# Patient Record
Sex: MALE | Race: ASIAN | NOT HISPANIC OR LATINO | Employment: OTHER | URBAN - METROPOLITAN AREA
[De-identification: names, ages, dates, MRNs, and addresses within clinical notes are randomized per-mention and may not be internally consistent; named-entity substitution may affect disease eponyms.]

---

## 2023-05-02 ENCOUNTER — NEW PATIENT (OUTPATIENT)
Dept: URBAN - METROPOLITAN AREA CLINIC 26 | Facility: CLINIC | Age: 68
End: 2023-05-02

## 2023-05-02 VITALS
BODY MASS INDEX: 26.18 KG/M2 | WEIGHT: 187 LBS | SYSTOLIC BLOOD PRESSURE: 178 MMHG | HEART RATE: 71 BPM | HEIGHT: 71 IN | DIASTOLIC BLOOD PRESSURE: 89 MMHG

## 2023-05-02 DIAGNOSIS — E11.3552: ICD-10-CM

## 2023-05-02 DIAGNOSIS — E11.3591: ICD-10-CM

## 2023-05-02 DIAGNOSIS — H04.123: ICD-10-CM

## 2023-05-02 DIAGNOSIS — H33.41: ICD-10-CM

## 2023-05-02 PROCEDURE — 92250 FUNDUS PHOTOGRAPHY W/I&R: CPT

## 2023-05-02 PROCEDURE — 92235 FLUORESCEIN ANGRPH MLTIFRAME: CPT

## 2023-05-02 PROCEDURE — 92134 CPTRZ OPH DX IMG PST SGM RTA: CPT

## 2023-05-02 PROCEDURE — 99204 OFFICE O/P NEW MOD 45 MIN: CPT

## 2023-05-02 PROCEDURE — 67028 INJECTION EYE DRUG: CPT

## 2023-05-02 ASSESSMENT — TONOMETRY
OS_IOP_MMHG: 14
OD_IOP_MMHG: 12

## 2023-05-02 ASSESSMENT — VISUAL ACUITY
OS_CC: 20/25+2
OD_CC: 20/40+2
OD_PH: 20/25-2
OD_SC: 20/50-2
OS_SC: 20/50-2

## 2023-06-06 ENCOUNTER — CLINIC PROCEDURE ONLY (OUTPATIENT)
Dept: URBAN - METROPOLITAN AREA CLINIC 26 | Facility: CLINIC | Age: 68
End: 2023-06-06

## 2023-06-06 DIAGNOSIS — E11.3591: ICD-10-CM

## 2023-06-06 DIAGNOSIS — H33.41: ICD-10-CM

## 2023-06-06 DIAGNOSIS — E11.3552: ICD-10-CM

## 2023-06-06 PROCEDURE — 67028 INJECTION EYE DRUG: CPT

## 2023-06-06 PROCEDURE — 92134 CPTRZ OPH DX IMG PST SGM RTA: CPT

## 2023-06-06 PROCEDURE — 92250 FUNDUS PHOTOGRAPHY W/I&R: CPT

## 2023-06-06 ASSESSMENT — TONOMETRY: OD_IOP_MMHG: 14

## 2023-06-06 ASSESSMENT — VISUAL ACUITY: OD_SC: 20/30-2

## 2023-07-18 ENCOUNTER — CLINIC PROCEDURE ONLY (OUTPATIENT)
Dept: URBAN - METROPOLITAN AREA CLINIC 26 | Facility: CLINIC | Age: 68
End: 2023-07-18

## 2023-07-18 DIAGNOSIS — E11.3552: ICD-10-CM

## 2023-07-18 DIAGNOSIS — E11.3591: ICD-10-CM

## 2023-07-18 DIAGNOSIS — H33.41: ICD-10-CM

## 2023-07-18 PROCEDURE — 92250 FUNDUS PHOTOGRAPHY W/I&R: CPT

## 2023-07-18 PROCEDURE — 67028 INJECTION EYE DRUG: CPT

## 2023-07-18 PROCEDURE — 92134 CPTRZ OPH DX IMG PST SGM RTA: CPT

## 2023-07-18 ASSESSMENT — VISUAL ACUITY
OD_PH: 20/30-2
OD_SC: 20/40-1

## 2023-07-18 ASSESSMENT — TONOMETRY: OD_IOP_MMHG: 10

## 2023-08-30 ENCOUNTER — FOLLOW UP (OUTPATIENT)
Dept: URBAN - METROPOLITAN AREA CLINIC 26 | Facility: CLINIC | Age: 68
End: 2023-08-30

## 2023-08-30 DIAGNOSIS — H04.123: ICD-10-CM

## 2023-08-30 DIAGNOSIS — E11.3552: ICD-10-CM

## 2023-08-30 DIAGNOSIS — H33.41: ICD-10-CM

## 2023-08-30 DIAGNOSIS — E11.3591: ICD-10-CM

## 2023-08-30 PROCEDURE — 67028 INJECTION EYE DRUG: CPT

## 2023-08-30 PROCEDURE — 92250 FUNDUS PHOTOGRAPHY W/I&R: CPT

## 2023-08-30 PROCEDURE — 92134 CPTRZ OPH DX IMG PST SGM RTA: CPT

## 2023-08-30 PROCEDURE — 92014 COMPRE OPH EXAM EST PT 1/>: CPT

## 2023-08-30 ASSESSMENT — VISUAL ACUITY
OD_SC: 20/50-2
OD_PH: 20/40+1
OS_SC: 20/40
OS_PH: 20/30

## 2023-08-30 ASSESSMENT — TONOMETRY
OS_IOP_MMHG: 13
OD_IOP_MMHG: 12

## 2023-09-05 ENCOUNTER — EMERGENCY VISIT (OUTPATIENT)
Dept: URBAN - METROPOLITAN AREA CLINIC 26 | Facility: CLINIC | Age: 68
End: 2023-09-05

## 2023-09-05 DIAGNOSIS — E11.3552: ICD-10-CM

## 2023-09-05 DIAGNOSIS — H04.123: ICD-10-CM

## 2023-09-05 DIAGNOSIS — H33.41: ICD-10-CM

## 2023-09-05 DIAGNOSIS — E11.3591: ICD-10-CM

## 2023-09-05 DIAGNOSIS — H11.31: ICD-10-CM

## 2023-09-05 PROCEDURE — 99213 OFFICE O/P EST LOW 20 MIN: CPT

## 2023-09-05 ASSESSMENT — VISUAL ACUITY
OD_SC: 20/40-2
OS_SC: 20/30

## 2023-09-05 ASSESSMENT — TONOMETRY
OS_IOP_MMHG: 17
OD_IOP_MMHG: 14

## 2023-10-16 ENCOUNTER — ADDENDUM (OUTPATIENT)
Dept: URBAN - METROPOLITAN AREA CLINIC 26 | Facility: CLINIC | Age: 68
End: 2023-10-16

## 2023-10-31 ENCOUNTER — CLINIC PROCEDURE ONLY (OUTPATIENT)
Dept: URBAN - METROPOLITAN AREA CLINIC 26 | Facility: CLINIC | Age: 68
End: 2023-10-31

## 2023-10-31 DIAGNOSIS — H33.41: ICD-10-CM

## 2023-10-31 DIAGNOSIS — E11.3591: ICD-10-CM

## 2023-10-31 PROCEDURE — 92134 CPTRZ OPH DX IMG PST SGM RTA: CPT

## 2023-10-31 PROCEDURE — 67028 INJECTION EYE DRUG: CPT

## 2023-10-31 PROCEDURE — 92250 FUNDUS PHOTOGRAPHY W/I&R: CPT | Mod: 59

## 2023-10-31 ASSESSMENT — VISUAL ACUITY
OD_SC: 20/50
OD_PH: 20/40-2

## 2023-10-31 ASSESSMENT — TONOMETRY: OD_IOP_MMHG: 13

## 2023-11-29 ENCOUNTER — OFFICE VISIT (OUTPATIENT)
Dept: URBAN - METROPOLITAN AREA CLINIC 63 | Facility: CLINIC | Age: 68
End: 2023-11-29

## 2023-11-29 RX ORDER — CILOSTAZOL 100 MG/1
TABLET ORAL
Qty: 180 TABLET | Status: ACTIVE | COMMUNITY

## 2023-11-29 RX ORDER — ATORVASTATIN CALCIUM 80 MG/1
TABLET, FILM COATED ORAL
Qty: 90 TABLET | Status: ACTIVE | COMMUNITY

## 2023-11-29 RX ORDER — GABAPENTIN 100 MG/1
CAPSULE ORAL
Qty: 45 CAPSULE | Status: ACTIVE | COMMUNITY

## 2023-11-29 RX ORDER — HYDROCODONE BITARTRATE AND ACETAMINOPHEN 5; 325 MG/1; MG/1
TABLET ORAL
Qty: 12 TABLET | Status: ACTIVE | COMMUNITY

## 2023-11-29 RX ORDER — SULFAMETHOXAZOLE AND TRIMETHOPRIM 400; 80 MG/1; MG/1
TABLET ORAL
Qty: 20 TABLET | Status: ACTIVE | COMMUNITY

## 2023-11-29 RX ORDER — EZETIMIBE 10 MG/1
TABLET ORAL
Qty: 90 TABLET | Status: ACTIVE | COMMUNITY

## 2023-11-29 RX ORDER — DOXYCYCLINE HYCLATE 100 MG/1
TABLET ORAL
Qty: 28 TABLET | Status: ACTIVE | COMMUNITY

## 2023-11-29 RX ORDER — INSULIN LISPRO 100 [IU]/ML
INJECTION, SOLUTION INTRAVENOUS; SUBCUTANEOUS
Qty: 30 MILLILITER | Status: ACTIVE | COMMUNITY

## 2023-11-29 RX ORDER — CALCIUM ACETATE 667 MG/1
CAPSULE ORAL
Qty: 270 CAPSULE | Status: ACTIVE | COMMUNITY

## 2023-11-29 NOTE — HPI-TODAY'S VISIT:
Jordon is a pleasant 68-year-old male who presents today for evaluation of a surveillance colonoscopy.  History of end-stage renal disease on dialysis.  Previous EGD and colonoscopy in 2021 but no records available for reviewLabs dated 9/15/2023 showed a normal hemoglobin.  Normal platelets.  PT/INR normal.  Glucose 256, creatinine 5.9, GFR 9.6.  Normal LFTs.  CRP normal. Labs dated 9/26/2023 showed negative hep B surface antigen.  Normal hemoglobin.  Normal platelets.  Creatinine 7.56, GFR 7.  GGT normal.  Normal LFTs.  Hep A antibody total positive.  Hep B core antibody total negative.  Hep C negative.  PT/INR normal. CT abdomen/pelvis with contrast dated 10/16/2021 showed no evidence of metastatic disease.  Punctate bilateral renal nonobstructing calculi.  Severe calcifications of the left common femoral artery almost occlusive unchanged from April 2021

## 2023-12-06 ENCOUNTER — OFFICE VISIT (OUTPATIENT)
Dept: URBAN - METROPOLITAN AREA CLINIC 60 | Facility: CLINIC | Age: 68
End: 2023-12-06

## 2023-12-06 ENCOUNTER — OFFICE VISIT (OUTPATIENT)
Dept: URBAN - METROPOLITAN AREA CLINIC 60 | Facility: CLINIC | Age: 68
End: 2023-12-06
Payer: MEDICARE

## 2023-12-06 ENCOUNTER — LAB OUTSIDE AN ENCOUNTER (OUTPATIENT)
Dept: URBAN - METROPOLITAN AREA CLINIC 60 | Facility: CLINIC | Age: 68
End: 2023-12-06

## 2023-12-06 ENCOUNTER — DASHBOARD ENCOUNTERS (OUTPATIENT)
Age: 68
End: 2023-12-06

## 2023-12-06 VITALS
DIASTOLIC BLOOD PRESSURE: 66 MMHG | OXYGEN SATURATION: 98 % | HEART RATE: 83 BPM | TEMPERATURE: 97.6 F | WEIGHT: 183.2 LBS | HEIGHT: 61 IN | BODY MASS INDEX: 34.59 KG/M2 | SYSTOLIC BLOOD PRESSURE: 116 MMHG

## 2023-12-06 DIAGNOSIS — I25.10 CAD, MULTIPLE VESSEL: ICD-10-CM

## 2023-12-06 DIAGNOSIS — N18.6 ESRD (END STAGE RENAL DISEASE): ICD-10-CM

## 2023-12-06 DIAGNOSIS — Z86.010 PERSONAL HISTORY OF COLONIC POLYPS: ICD-10-CM

## 2023-12-06 DIAGNOSIS — Z99.2 DEPENDENCE ON RENAL DIALYSIS: ICD-10-CM

## 2023-12-06 PROBLEM — 400047006: Status: ACTIVE | Noted: 2023-12-06

## 2023-12-06 PROBLEM — 105502003: Status: ACTIVE | Noted: 2023-12-06

## 2023-12-06 PROBLEM — 46177005: Status: ACTIVE | Noted: 2023-12-06

## 2023-12-06 PROBLEM — 428982002: Status: ACTIVE | Noted: 2023-12-06

## 2023-12-06 PROBLEM — 371803003: Status: ACTIVE | Noted: 2023-12-06

## 2023-12-06 PROBLEM — 428283002: Status: ACTIVE | Noted: 2023-12-06

## 2023-12-06 PROCEDURE — 99203 OFFICE O/P NEW LOW 30 MIN: CPT | Performed by: PHYSICIAN ASSISTANT

## 2023-12-06 RX ORDER — CALCIUM ACETATE 667 MG/1
CAPSULE ORAL
Qty: 270 CAPSULE | Status: ACTIVE | COMMUNITY

## 2023-12-06 RX ORDER — HYDROCODONE BITARTRATE AND ACETAMINOPHEN 5; 325 MG/1; MG/1
TABLET ORAL
Qty: 12 TABLET | Status: ACTIVE | COMMUNITY

## 2023-12-06 RX ORDER — POLYETHYLENE GLYCOL-3350 AND ELECTROLYTES 236; 6.74; 5.86; 2.97; 22.74 G/274.31G; G/274.31G; G/274.31G; G/274.31G; G/274.31G
4000ML POWDER, FOR SOLUTION ORAL ONCE
Qty: 4000 MILLILITER | Refills: 0 | OUTPATIENT
Start: 2023-12-06 | End: 2023-12-07

## 2023-12-06 RX ORDER — SULFAMETHOXAZOLE AND TRIMETHOPRIM 400; 80 MG/1; MG/1
TABLET ORAL
Qty: 20 TABLET | Status: ACTIVE | COMMUNITY

## 2023-12-06 RX ORDER — ATORVASTATIN CALCIUM 80 MG/1
TABLET, FILM COATED ORAL
Qty: 90 TABLET | Status: ACTIVE | COMMUNITY

## 2023-12-06 RX ORDER — GABAPENTIN 100 MG/1
CAPSULE ORAL
Qty: 45 CAPSULE | Status: ACTIVE | COMMUNITY

## 2023-12-06 RX ORDER — PANTOPRAZOLE SODIUM 40 MG/1
TABLET, DELAYED RELEASE ORAL
Qty: 90 TABLET | Status: ACTIVE | COMMUNITY

## 2023-12-06 RX ORDER — INSULIN LISPRO 100 [IU]/ML
INJECTION, SOLUTION INTRAVENOUS; SUBCUTANEOUS
Qty: 30 MILLILITER | Status: ACTIVE | COMMUNITY

## 2023-12-06 RX ORDER — DOXYCYCLINE HYCLATE 100 MG/1
TABLET ORAL
Qty: 28 TABLET | Status: ACTIVE | COMMUNITY

## 2023-12-06 RX ORDER — CARVEDILOL 25 MG/1
TABLET, FILM COATED ORAL
Qty: 180 TABLET | Status: ACTIVE | COMMUNITY

## 2023-12-06 RX ORDER — CILOSTAZOL 100 MG/1
TABLET ORAL
Qty: 180 TABLET | Status: ACTIVE | COMMUNITY

## 2023-12-06 RX ORDER — EZETIMIBE 10 MG/1
TABLET ORAL
Qty: 90 TABLET | Status: ACTIVE | COMMUNITY

## 2023-12-06 RX ORDER — CLOPIDOGREL BISULFATE 75 MG/1
1 TABLET TABLET ORAL ONCE A DAY
Status: ACTIVE | COMMUNITY

## 2023-12-06 NOTE — HPI-TODAY'S VISIT:
68-year-old male with ESRD on hemodialysis is referred to the office to schedule colonoscopy in anticipation of kidney transplant. His past medical history is also significant for hypertension, CHF, prostate cancer, peripheral vascular disease, CAD with prior CABG in 2021, right carotid stenosis, diabetes mellitus, history of stroke His last EGD and colonoscopy were done September 21, 2021. Procedure notes are not available but his EGD apparently showed gastritis and esophagitis. Colonoscopy demonstrated multiple colon polyps.  He is following locally with Dr Lorenz and is scheduled for nuclear stress test in a couple weeks. He has no Gi complaints. He has no pyrosis, dysphagia, odynophagia, nausea, vomiting, abdominal pain, constipation, diarrhea, rectal bleeding.    Labs dated 9/15/2023 showed a normal hemoglobin.  Normal platelets.  PT/INR normal.  Glucose 256, creatinine 5.9, GFR 9.6.  Normal LFTs.  CRP normal. Labs dated 9/26/2023 showed negative hep B surface antigen.  Normal hemoglobin.  Normal platelets.  Creatinine 7.56, GFR 7.  GGT normal.  Normal LFTs.  Hep A antibody total positive.  Hep B core antibody total negative.  Hep C negative.  PT/INR normal. CT abdomen/pelvis with contrast dated 10/16/2021 showed no evidence of metastatic disease.  Punctate bilateral renal nonobstructing calculi.  Severe calcifications of the left common femoral artery almost occlusive unchanged from April 2021 PMH: End-stage renal disease on dialysis, anemia of chronic disease, renal osteodystrophy, hypertension, CHF, prostate cancer, PVD, hyperlipidemia PSH: Amputation of great toe, dialysis catheter, carotid stent, CABG, prostatectomy with pelvic lymphadenectomy, appendectomy, cardiac stent, eye surgery

## 2023-12-06 NOTE — HPI-TODAY'S VISIT:
Jordon is a pleasant 68-year-old male who presents today for evaluation of a surveillance colonoscopy.  History of end-stage renal disease on dialysis.  Previous EGD and colonoscopy in 2021 but no records available for reviewLabs dated 9/15/2023 showed a normal hemoglobin.  Normal platelets.  PT/INR normal.  Glucose 256, creatinine 5.9, GFR 9.6.  Normal LFTs.  CRP normal. Labs dated 9/26/2023 showed negative hep B surface antigen.  Normal hemoglobin.  Normal platelets.  Creatinine 7.56, GFR 7.  GGT normal.  Normal LFTs.  Hep A antibody total positive.  Hep B core antibody total negative.  Hep C negative.  PT/INR normal. CT abdomen/pelvis with contrast dated 10/16/2021 showed no evidence of metastatic disease.  Punctate bilateral renal nonobstructing calculi.  Severe calcifications of the left common femoral artery almost occlusive unchanged from April 2021 PMH: End-stage renal disease on dialysis, anemia of chronic disease, renal osteodystrophy, hypertension, CHF, prostate cancer, PVD, hyperlipidemia PSH: Amputation of great toe, dialysis catheter, carotid stent, CABG, prostatectomy with pelvic lymphadenectomy, appendectomy, cardiac stent, eye surgery

## 2024-01-12 ENCOUNTER — OFFICE VISIT (OUTPATIENT)
Dept: URBAN - METROPOLITAN AREA MEDICAL CENTER 14 | Facility: MEDICAL CENTER | Age: 69
End: 2024-01-12

## 2024-03-08 ENCOUNTER — COMPREHENSIVE EXAM (OUTPATIENT)
Dept: URBAN - METROPOLITAN AREA CLINIC 26 | Facility: CLINIC | Age: 69
End: 2024-03-08

## 2024-03-08 DIAGNOSIS — H33.41: ICD-10-CM

## 2024-03-08 DIAGNOSIS — Z98.890: ICD-10-CM

## 2024-03-08 DIAGNOSIS — E11.3591: ICD-10-CM

## 2024-03-08 DIAGNOSIS — H43.11: ICD-10-CM

## 2024-03-08 DIAGNOSIS — H04.123: ICD-10-CM

## 2024-03-08 DIAGNOSIS — Z96.1: ICD-10-CM

## 2024-03-08 DIAGNOSIS — H02.834: ICD-10-CM

## 2024-03-08 DIAGNOSIS — H02.831: ICD-10-CM

## 2024-03-08 DIAGNOSIS — E11.3552: ICD-10-CM

## 2024-03-08 PROCEDURE — J3490AVA AVASTIN *

## 2024-03-08 PROCEDURE — 92014 COMPRE OPH EXAM EST PT 1/>: CPT

## 2024-03-08 PROCEDURE — 92134 CPTRZ OPH DX IMG PST SGM RTA: CPT

## 2024-03-08 PROCEDURE — 67028 INJECTION EYE DRUG: CPT

## 2024-03-08 PROCEDURE — 92250 FUNDUS PHOTOGRAPHY W/I&R: CPT

## 2024-03-08 ASSESSMENT — VISUAL ACUITY
OD_SC: 20/40+1
OS_PH: 20/25
OS_SC: 20/40-1

## 2024-03-08 ASSESSMENT — TONOMETRY
OD_IOP_MMHG: 6
OS_IOP_MMHG: 6

## 2024-04-12 ENCOUNTER — CLINIC PROCEDURE ONLY (OUTPATIENT)
Dept: URBAN - METROPOLITAN AREA CLINIC 26 | Facility: CLINIC | Age: 69
End: 2024-04-12

## 2024-04-12 DIAGNOSIS — E11.3591: ICD-10-CM

## 2024-04-12 DIAGNOSIS — E11.3552: ICD-10-CM

## 2024-04-12 DIAGNOSIS — Z98.890: ICD-10-CM

## 2024-04-12 PROCEDURE — 67028 INJECTION EYE DRUG: CPT

## 2024-04-12 PROCEDURE — J3490AVA AVASTIN *

## 2024-04-12 PROCEDURE — 92134 CPTRZ OPH DX IMG PST SGM RTA: CPT

## 2024-04-12 PROCEDURE — 92250 FUNDUS PHOTOGRAPHY W/I&R: CPT

## 2024-04-12 ASSESSMENT — TONOMETRY: OD_IOP_MMHG: 14

## 2024-04-12 ASSESSMENT — VISUAL ACUITY: OD_SC: 20/25-1

## 2024-05-31 ENCOUNTER — FOLLOW UP (OUTPATIENT)
Dept: URBAN - METROPOLITAN AREA CLINIC 26 | Facility: CLINIC | Age: 69
End: 2024-05-31

## 2024-05-31 VITALS
SYSTOLIC BLOOD PRESSURE: 169 MMHG | HEIGHT: 71 IN | HEART RATE: 83 BPM | WEIGHT: 190 LBS | DIASTOLIC BLOOD PRESSURE: 92 MMHG | BODY MASS INDEX: 26.6 KG/M2

## 2024-05-31 DIAGNOSIS — E11.3591: ICD-10-CM

## 2024-05-31 DIAGNOSIS — E11.3552: ICD-10-CM

## 2024-05-31 DIAGNOSIS — Z98.890: ICD-10-CM

## 2024-05-31 PROCEDURE — 92235 FLUORESCEIN ANGRPH MLTIFRAME: CPT

## 2024-05-31 PROCEDURE — 92134 CPTRZ OPH DX IMG PST SGM RTA: CPT

## 2024-05-31 PROCEDURE — 92250 FUNDUS PHOTOGRAPHY W/I&R: CPT

## 2024-05-31 ASSESSMENT — VISUAL ACUITY
OS_SC: 20/30-1
OD_SC: 20/25+1
OS_PH: 20/25

## 2024-05-31 ASSESSMENT — TONOMETRY
OD_IOP_MMHG: 13
OS_IOP_MMHG: 12

## 2024-07-26 ENCOUNTER — FOLLOW UP (OUTPATIENT)
Dept: URBAN - METROPOLITAN AREA CLINIC 26 | Facility: CLINIC | Age: 69
End: 2024-07-26

## 2024-07-26 VITALS — BODY MASS INDEX: 26.6 KG/M2 | WEIGHT: 190 LBS | HEIGHT: 71 IN

## 2024-07-26 DIAGNOSIS — H43.11: ICD-10-CM

## 2024-07-26 DIAGNOSIS — Z96.1: ICD-10-CM

## 2024-07-26 DIAGNOSIS — E11.3552: ICD-10-CM

## 2024-07-26 DIAGNOSIS — H02.834: ICD-10-CM

## 2024-07-26 DIAGNOSIS — H02.831: ICD-10-CM

## 2024-07-26 DIAGNOSIS — H04.123: ICD-10-CM

## 2024-07-26 DIAGNOSIS — E11.3591: ICD-10-CM

## 2024-07-26 DIAGNOSIS — H33.41: ICD-10-CM

## 2024-07-26 DIAGNOSIS — Z98.890: ICD-10-CM

## 2024-07-26 PROCEDURE — 67028 INJECTION EYE DRUG: CPT

## 2024-07-26 PROCEDURE — 92250 FUNDUS PHOTOGRAPHY W/I&R: CPT

## 2024-07-26 PROCEDURE — 92014 COMPRE OPH EXAM EST PT 1/>: CPT | Mod: 25

## 2024-07-26 PROCEDURE — J3490AVA AVASTIN *

## 2024-07-26 PROCEDURE — 92134 CPTRZ OPH DX IMG PST SGM RTA: CPT

## 2024-07-26 PROCEDURE — 92235 FLUORESCEIN ANGRPH MLTIFRAME: CPT

## 2024-07-26 ASSESSMENT — VISUAL ACUITY
OD_SC: 20/20
OS_SC: 20/25-1

## 2024-07-26 ASSESSMENT — TONOMETRY
OS_IOP_MMHG: 13
OD_IOP_MMHG: 14

## 2024-08-23 ENCOUNTER — CLINIC PROCEDURE ONLY (OUTPATIENT)
Dept: URBAN - METROPOLITAN AREA CLINIC 26 | Facility: CLINIC | Age: 69
End: 2024-08-23

## 2024-08-23 DIAGNOSIS — E11.3591: ICD-10-CM

## 2024-08-23 PROCEDURE — 67228 TREATMENT X10SV RETINOPATHY: CPT

## 2024-08-23 ASSESSMENT — TONOMETRY: OD_IOP_MMHG: 09

## 2024-08-23 ASSESSMENT — VISUAL ACUITY: OD_CC: 20/25+2

## 2024-09-06 ENCOUNTER — CLINIC PROCEDURE ONLY (OUTPATIENT)
Dept: URBAN - METROPOLITAN AREA CLINIC 26 | Facility: CLINIC | Age: 69
End: 2024-09-06

## 2024-09-06 DIAGNOSIS — H43.11: ICD-10-CM

## 2024-09-06 PROCEDURE — 67228 TREATMENT X10SV RETINOPATHY: CPT

## 2024-09-20 ENCOUNTER — COMPREHENSIVE EXAM (OUTPATIENT)
Dept: URBAN - METROPOLITAN AREA CLINIC 26 | Facility: CLINIC | Age: 69
End: 2024-09-20

## 2024-09-20 DIAGNOSIS — H33.41: ICD-10-CM

## 2024-09-20 DIAGNOSIS — E11.3591: ICD-10-CM

## 2024-09-20 DIAGNOSIS — H02.831: ICD-10-CM

## 2024-09-20 DIAGNOSIS — H04.123: ICD-10-CM

## 2024-09-20 DIAGNOSIS — Z98.890: ICD-10-CM

## 2024-09-20 DIAGNOSIS — E11.3552: ICD-10-CM

## 2024-09-20 DIAGNOSIS — H02.834: ICD-10-CM

## 2024-09-20 DIAGNOSIS — H26.493: ICD-10-CM

## 2024-09-20 DIAGNOSIS — H43.11: ICD-10-CM

## 2024-09-20 DIAGNOSIS — Z96.1: ICD-10-CM

## 2024-09-20 PROCEDURE — 92014 COMPRE OPH EXAM EST PT 1/>: CPT

## 2024-09-20 PROCEDURE — 92250 FUNDUS PHOTOGRAPHY W/I&R: CPT

## 2024-09-20 PROCEDURE — 92134 CPTRZ OPH DX IMG PST SGM RTA: CPT

## 2024-11-08 ENCOUNTER — COMPREHENSIVE EXAM (OUTPATIENT)
Dept: URBAN - METROPOLITAN AREA CLINIC 26 | Facility: CLINIC | Age: 69
End: 2024-11-08

## 2024-11-08 DIAGNOSIS — H26.493: ICD-10-CM

## 2024-11-08 DIAGNOSIS — H17.9: ICD-10-CM

## 2024-11-08 DIAGNOSIS — H02.834: ICD-10-CM

## 2024-11-08 DIAGNOSIS — E11.3552: ICD-10-CM

## 2024-11-08 DIAGNOSIS — Z98.890: ICD-10-CM

## 2024-11-08 DIAGNOSIS — H02.831: ICD-10-CM

## 2024-11-08 DIAGNOSIS — H43.11: ICD-10-CM

## 2024-11-08 DIAGNOSIS — H33.41: ICD-10-CM

## 2024-11-08 DIAGNOSIS — E11.3591: ICD-10-CM

## 2024-11-08 DIAGNOSIS — H04.123: ICD-10-CM

## 2024-11-08 DIAGNOSIS — Z96.1: ICD-10-CM

## 2024-11-08 PROCEDURE — 99213 OFFICE O/P EST LOW 20 MIN: CPT

## 2024-11-08 PROCEDURE — J3490AVA AVASTIN *

## 2024-11-08 PROCEDURE — 92250 FUNDUS PHOTOGRAPHY W/I&R: CPT

## 2024-11-08 PROCEDURE — 67028 INJECTION EYE DRUG: CPT

## 2024-11-08 PROCEDURE — 92134 CPTRZ OPH DX IMG PST SGM RTA: CPT

## 2025-01-08 ENCOUNTER — COMPREHENSIVE EXAM (OUTPATIENT)
Age: 70
End: 2025-01-08

## 2025-01-08 DIAGNOSIS — H26.493: ICD-10-CM

## 2025-01-08 DIAGNOSIS — H02.831: ICD-10-CM

## 2025-01-08 DIAGNOSIS — H43.11: ICD-10-CM

## 2025-01-08 DIAGNOSIS — Z96.1: ICD-10-CM

## 2025-01-08 DIAGNOSIS — H33.41: ICD-10-CM

## 2025-01-08 DIAGNOSIS — H17.9: ICD-10-CM

## 2025-01-08 DIAGNOSIS — Z98.890: ICD-10-CM

## 2025-01-08 DIAGNOSIS — H04.123: ICD-10-CM

## 2025-01-08 DIAGNOSIS — E11.3591: ICD-10-CM

## 2025-01-08 DIAGNOSIS — E11.3552: ICD-10-CM

## 2025-01-08 DIAGNOSIS — H02.834: ICD-10-CM

## 2025-01-08 PROCEDURE — 92235 FLUORESCEIN ANGRPH MLTIFRAME: CPT

## 2025-01-08 PROCEDURE — 92250 FUNDUS PHOTOGRAPHY W/I&R: CPT

## 2025-01-08 PROCEDURE — 92134 CPTRZ OPH DX IMG PST SGM RTA: CPT

## 2025-01-08 PROCEDURE — 92014 COMPRE OPH EXAM EST PT 1/>: CPT

## 2025-01-08 PROCEDURE — 67028 INJECTION EYE DRUG: CPT

## 2025-01-08 PROCEDURE — J3490AVA AVASTIN *

## 2025-02-21 ENCOUNTER — CLINIC PROCEDURE ONLY (OUTPATIENT)
Age: 70
End: 2025-02-21

## 2025-02-21 DIAGNOSIS — E11.3552: ICD-10-CM

## 2025-02-21 DIAGNOSIS — E11.3591: ICD-10-CM

## 2025-02-21 DIAGNOSIS — Z98.890: ICD-10-CM

## 2025-02-21 PROCEDURE — 67028 INJECTION EYE DRUG: CPT

## 2025-02-21 PROCEDURE — 92250 FUNDUS PHOTOGRAPHY W/I&R: CPT | Mod: 59

## 2025-02-21 PROCEDURE — J3490AVA AVASTIN *

## 2025-02-21 PROCEDURE — 92134 CPTRZ OPH DX IMG PST SGM RTA: CPT
